# Patient Record
Sex: MALE | Race: AMERICAN INDIAN OR ALASKA NATIVE
[De-identification: names, ages, dates, MRNs, and addresses within clinical notes are randomized per-mention and may not be internally consistent; named-entity substitution may affect disease eponyms.]

---

## 2019-07-26 ENCOUNTER — HOSPITAL ENCOUNTER (EMERGENCY)
Dept: HOSPITAL 5 - ED | Age: 44
Discharge: HOME | End: 2019-07-26
Payer: SELF-PAY

## 2019-07-26 VITALS — DIASTOLIC BLOOD PRESSURE: 80 MMHG | SYSTOLIC BLOOD PRESSURE: 130 MMHG

## 2019-07-26 DIAGNOSIS — R07.89: Primary | ICD-10-CM

## 2019-07-26 DIAGNOSIS — L98.9: ICD-10-CM

## 2019-07-26 DIAGNOSIS — E11.9: ICD-10-CM

## 2019-07-26 LAB
ALBUMIN SERPL-MCNC: 4.6 G/DL (ref 3.9–5)
ALT SERPL-CCNC: 33 UNITS/L (ref 7–56)
BASOPHILS # (AUTO): 0 K/MM3 (ref 0–0.1)
BASOPHILS NFR BLD AUTO: 0.3 % (ref 0–1.8)
BUN SERPL-MCNC: 15 MG/DL (ref 9–20)
BUN/CREAT SERPL: 25 %
CALCIUM SERPL-MCNC: 9.5 MG/DL (ref 8.4–10.2)
EOSINOPHIL # BLD AUTO: 0.2 K/MM3 (ref 0–0.4)
EOSINOPHIL NFR BLD AUTO: 2.4 % (ref 0–4.3)
HCT VFR BLD CALC: 42.1 % (ref 35.5–45.6)
HEMOLYSIS INDEX: 5
HGB BLD-MCNC: 14.4 GM/DL (ref 11.8–15.2)
LYMPHOCYTES # BLD AUTO: 2.4 K/MM3 (ref 1.2–5.4)
LYMPHOCYTES NFR BLD AUTO: 27.5 % (ref 13.4–35)
MCHC RBC AUTO-ENTMCNC: 34 % (ref 32–34)
MCV RBC AUTO: 84 FL (ref 84–94)
MONOCYTES # (AUTO): 0.7 K/MM3 (ref 0–0.8)
MONOCYTES % (AUTO): 8.2 % (ref 0–7.3)
PLATELET # BLD: 333 K/MM3 (ref 140–440)
RBC # BLD AUTO: 4.99 M/MM3 (ref 3.65–5.03)

## 2019-07-26 PROCEDURE — 71046 X-RAY EXAM CHEST 2 VIEWS: CPT

## 2019-07-26 PROCEDURE — 84484 ASSAY OF TROPONIN QUANT: CPT

## 2019-07-26 PROCEDURE — 93005 ELECTROCARDIOGRAM TRACING: CPT

## 2019-07-26 PROCEDURE — 36415 COLL VENOUS BLD VENIPUNCTURE: CPT

## 2019-07-26 PROCEDURE — 80053 COMPREHEN METABOLIC PANEL: CPT

## 2019-07-26 PROCEDURE — 83880 ASSAY OF NATRIURETIC PEPTIDE: CPT

## 2019-07-26 PROCEDURE — 85025 COMPLETE CBC W/AUTO DIFF WBC: CPT

## 2019-07-26 PROCEDURE — 93010 ELECTROCARDIOGRAM REPORT: CPT

## 2019-07-26 NOTE — XRAY REPORT
CHEST 2 VIEWS 



INDICATION / CLINICAL INFORMATION:

chest discomfort.



COMPARISON: 

None available.



FINDINGS:



SUPPORT DEVICES: None.

HEART / MEDIASTINUM: No significant abnormality. 

LUNGS / PLEURA: No significant pulmonary or pleural abnormality. No pneumothorax. 



ADDITIONAL FINDINGS: No significant additional findings.



IMPRESSION:

1. No acute findings.



Signer Name: Moris Carson MD FACR 

Signed: 7/26/2019 4:14 PM

 Workstation Name: [id number]

## 2019-07-26 NOTE — EMERGENCY DEPARTMENT REPORT
ED General Adult HPI





- General


Chief complaint: Chest Pain


Stated complaint: CHEST PAIN


Time Seen by Provider: 07/26/19 15:00


Source: patient,  (abby for translation)


Mode of arrival: Ambulatory


Limitations: Language Barrier





- History of Present Illness


Initial comments: 





Patient is a 43-year-old male who presents the emergency room with complaints of

the sensation of his ankle monitor going off today.  He states he has had ankle 

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  Patient states he felt like it went up his leg 

to his chest, back, shoulders, and head.  He was initially complaining of chest 

discomfort but has no chest pain currently.  He denies any nausea, vomiting, 

shortness of breath.  He states he also came to the emergency room for blisters 

on his penis that he's had for the last 2 years.  States that they're painful, 

fluid-filled, and come and go.  Denies any dysuria, testicular pain, testicular 

edema, abdominal pain. he has never seen anyone for this. 





- Related Data


                                  Previous Rx's











 Medication  Instructions  Recorded  Last Taken  Type


 


Acyclovir [Zovirax Tab] 800 mg PO BID 5 Days #10 tablet 07/26/19 Unknown Rx











                                    Allergies











Allergy/AdvReac Type Severity Reaction Status Date / Time


 


No Known Allergies Allergy   Unverified 07/26/19 14:10














ED Review of Systems


ROS: 


Stated complaint: CHEST PAIN


Other details as noted in HPI





Comment: All other systems reviewed and negative





ED Past Medical Hx





- Past Medical History


Hx Diabetes: Yes





- Social History


Smoking Status: Never Smoker


Substance Use Type: None





- Medications


Home Medications: 


                                Home Medications











 Medication  Instructions  Recorded  Confirmed  Last Taken  Type


 


Acyclovir [Zovirax Tab] 800 mg PO BID 5 Days #10 tablet 07/26/19  Unknown Rx














ED Physical Exam





- General


Limitations: Language Barrier


General appearance: alert, in no apparent distress





- Head


Head exam: Present: atraumatic, normocephalic





- Eye


Eye exam: Present: normal appearance





- ENT


ENT exam: Present: mucous membranes moist





- Respiratory


Respiratory exam: Present: normal lung sounds bilaterally.  Absent: respiratory 

distress, wheezes, rales, rhonchi, stridor, chest wall tenderness, accessory 

muscle use, decreased breath sounds, prolonged expiratory





- Cardiovascular


Cardiovascular Exam: Present: regular rate, normal rhythm, normal heart sounds. 

Absent: systolic murmur, diastolic murmur, rubs, gallop





- 


 exam: Present: other (small shallow blisters/ulcerations to the penile shaft 

which are painful).  Absent: testicular tenderness, urethral discharge, scrotal 

swelling





- Extremities Exam


Extremities exam: Present: other (ankle monitor in place with no surrounding 

skin disturbance)





- Neurological Exam


Neurological exam: Present: alert, oriented X3





- Psychiatric


Psychiatric exam: Present: normal affect, normal mood





- Skin


Skin exam: Present: warm, dry, intact





ED Course


                                   Vital Signs











  07/26/19





  14:14


 


Temperature 97.8 F


 


Pulse Rate 76


 


Respiratory 17





Rate 


 


Blood Pressure 132/84


 


O2 Sat by Pulse 99





Oximetry 














ED Medical Decision Making





- Lab Data


Result diagrams: 


                                 07/26/19 15:26





                                 07/26/19 15:26








                                   Lab Results











  07/26/19 07/26/19 Range/Units





  15:26 15:26 


 


WBC  8.7   (4.5-11.0)  K/mm3


 


RBC  4.99   (3.65-5.03)  M/mm3


 


Hgb  14.4   (11.8-15.2)  gm/dl


 


Hct  42.1   (35.5-45.6)  %


 


MCV  84   (84-94)  fl


 


MCH  29   (28-32)  pg


 


MCHC  34   (32-34)  %


 


RDW  13.3   (13.2-15.2)  %


 


Plt Count  333   (140-440)  K/mm3


 


Lymph % (Auto)  27.5   (13.4-35.0)  %


 


Mono % (Auto)  8.2 H   (0.0-7.3)  %


 


Eos % (Auto)  2.4   (0.0-4.3)  %


 


Baso % (Auto)  0.3   (0.0-1.8)  %


 


Lymph #  2.4   (1.2-5.4)  K/mm3


 


Mono #  0.7   (0.0-0.8)  K/mm3


 


Eos #  0.2   (0.0-0.4)  K/mm3


 


Baso #  0.0   (0.0-0.1)  K/mm3


 


Seg Neutrophils %  61.6   (40.0-70.0)  %


 


Seg Neutrophils #  5.3   (1.8-7.7)  K/mm3


 


Sodium   141  (137-145)  mmol/L


 


Potassium   4.0  (3.6-5.0)  mmol/L


 


Chloride   103.5  ()  mmol/L


 


Carbon Dioxide   27  (22-30)  mmol/L


 


Anion Gap   15  mmol/L


 


BUN   15  (9-20)  mg/dL


 


Creatinine   0.6 L  (0.8-1.5)  mg/dL


 


Estimated GFR   > 60  ml/min


 


BUN/Creatinine Ratio   25  %


 


Glucose   98  ()  mg/dL


 


Calcium   9.5  (8.4-10.2)  mg/dL


 


Total Bilirubin   0.20  (0.1-1.2)  mg/dL


 


AST   27  (5-40)  units/L


 


ALT   33  (7-56)  units/L


 


Alkaline Phosphatase   88  ()  units/L


 


Troponin T   < 0.010  (0.00-0.029)  ng/mL


 


NT-Pro-B Natriuret Pep   15.56  (0-450)  pg/mL


 


Total Protein   7.8  (6.3-8.2)  g/dL


 


Albumin   4.6  (3.9-5)  g/dL


 


Albumin/Globulin Ratio   1.4  %














- EKG Data


EKG shows normal: sinus rhythm, axis, intervals, QRS complexes


Rate: normal





- EKG Data





07/26/19 16:30


early repol pattern





- Radiology Data


Radiology results: report reviewed





CHEST 2 VIEWS 





INDICATION / CLINICAL INFORMATION: 


chest discomfort. 





COMPARISON: 


None available. 





FINDINGS: 





SUPPORT DEVICES: None. 


HEART / MEDIASTINUM: No significant abnormality. 


LUNGS / PLEURA: No significant pulmonary or pleural abnormality. No 

pneumothorax. 





ADDITIONAL FINDINGS: No significant additional findings. 





IMPRESSION: 


1. No acute findings. 





Signer Name: Moris Carson MD FACR 


Signed: 7/26/2019 4:14 PM 


Workstation Name: [id number] 








Transcribed By: MS 


Dictated By: Moris Carson MD 


Electronically Authenticated By: Moris Carson MD 


Signed Date/Time: 07/26/19 6890 


Critical care attestation.: 


If time is entered above; I have spent that time in minutes in the direct care 

of this critically ill patient, excluding procedure time.








ED Disposition


Clinical Impression: 


 Penile lesion





Chest pain


Qualifiers:


 Chest pain type: unspecified Qualified Code(s): R07.9 - Chest pain, unspecified





Disposition: DC-01 TO HOME OR SELFCARE


Is pt being admited?: No


Does the pt Need Aspirin: No


Condition: Stable


Instructions:  Chest Pain (ED), Genital Herpes Simplex (ED)


Additional Instructions: 


Please take medication as prescribed.  Please be seen by the health department 

or a primary care doctor for STD testing.  follow up with a primary care doctor 

in the next 2-3 days.  Into the emergency room for any new or worsening 

symptoms.  Please have partner tested and treated as well. please have your 

ankle monitor checked by the police department. 


Prescriptions: 


Acyclovir [Zovirax Tab] 800 mg PO BID 5 Days #10 tablet


Referrals: 


[facility]3 Days


[facility] [Outside] - 2-3 Days


Time of Disposition: 16:31


Print Language: [place]